# Patient Record
Sex: MALE | Race: WHITE | Employment: FULL TIME | URBAN - METROPOLITAN AREA
[De-identification: names, ages, dates, MRNs, and addresses within clinical notes are randomized per-mention and may not be internally consistent; named-entity substitution may affect disease eponyms.]

---

## 2017-01-11 ENCOUNTER — OFFICE VISIT (OUTPATIENT)
Dept: FAMILY MEDICINE CLINIC | Facility: CLINIC | Age: 42
End: 2017-01-11

## 2017-01-11 VITALS
WEIGHT: 204 LBS | DIASTOLIC BLOOD PRESSURE: 60 MMHG | HEIGHT: 72 IN | HEART RATE: 72 BPM | RESPIRATION RATE: 14 BRPM | TEMPERATURE: 98 F | SYSTOLIC BLOOD PRESSURE: 122 MMHG | BODY MASS INDEX: 27.63 KG/M2

## 2017-01-11 DIAGNOSIS — Z00.00 ANNUAL PHYSICAL EXAM: Primary | ICD-10-CM

## 2017-01-11 DIAGNOSIS — N52.9 ERECTILE DYSFUNCTION, UNSPECIFIED ERECTILE DYSFUNCTION TYPE: ICD-10-CM

## 2017-01-11 DIAGNOSIS — F43.8 ADJUSTMENT REACTION TO CHRONIC STRESS: ICD-10-CM

## 2017-01-11 DIAGNOSIS — F17.201 TOBACCO DEPENDENCE IN REMISSION: ICD-10-CM

## 2017-01-11 PROCEDURE — 99396 PREV VISIT EST AGE 40-64: CPT | Performed by: FAMILY MEDICINE

## 2017-01-11 RX ORDER — ALPRAZOLAM 1 MG/1
TABLET ORAL
Qty: 60 TABLET | Refills: 5 | Status: SHIPPED | OUTPATIENT
Start: 2017-01-11 | End: 2017-09-05

## 2017-01-11 RX ORDER — SILDENAFIL CITRATE 20 MG/1
TABLET ORAL DAILY PRN
Qty: 30 TABLET | Refills: 3 | Status: SHIPPED | OUTPATIENT
Start: 2017-01-11 | End: 2018-04-09

## 2017-01-11 NOTE — PROGRESS NOTES
Nomi Agustin is a 37year old male who presents for a complete physical exam.   HPI:   Patient presents with:  Physical      His last annual assessment has been over 1 year: Annual Physical due on 12/21/2016       Pt complains of doing well, wt down 20 has past surgical history that includes upper gi endoscopy,exam (2009); other surgical history; and Laparoscopic cholecystectomy. His family history includes Cancer (age of onset: [de-identified]) in his maternal grandfather.      He has a current medication list whic normal.   Mouth/Throat: Uvula is midline, oropharynx is clear and moist and mucous membranes are normal.   Eyes: Conjunctivae and EOM are normal. Pupils are equal, round, and reactive to light. Neck: Trachea normal and normal range of motion.  Neck supple years.    Assessment:  Problem List Items Addressed This Visit        Mental Health    Adjustment reaction to chronic stress    Overview     Alprazolam 0.5 mg prn         Current Assessment & Plan     Stable, refill meds but not using daily         Relevan

## 2017-09-02 DIAGNOSIS — F43.8 ADJUSTMENT REACTION TO CHRONIC STRESS: ICD-10-CM

## 2017-09-02 RX ORDER — ALPRAZOLAM 1 MG/1
TABLET ORAL
Qty: 60 TABLET | Refills: 0 | Status: CANCELLED | OUTPATIENT
Start: 2017-09-02

## 2017-09-05 ENCOUNTER — TELEPHONE (OUTPATIENT)
Dept: FAMILY MEDICINE CLINIC | Facility: CLINIC | Age: 42
End: 2017-09-05

## 2017-09-05 DIAGNOSIS — F43.8 ADJUSTMENT REACTION TO CHRONIC STRESS: ICD-10-CM

## 2017-09-05 RX ORDER — ALPRAZOLAM 1 MG/1
TABLET ORAL
Qty: 60 TABLET | Refills: 0 | OUTPATIENT
Start: 2017-09-05 | End: 2017-10-09

## 2017-09-05 NOTE — TELEPHONE ENCOUNTER
Refill request sent from pharmacy for Alprazolam. LOV 01/11/17. Will you refill ? Routed to Dr Bekah Ricardo.

## 2017-09-05 NOTE — TELEPHONE ENCOUNTER
Ok to phone in 1 month, but beeds follow up, LOV 1.2017      Signed Prescriptions Disp Refills    ALPRAZolam 1 MG Oral Tab 60 tablet 0      Sig: TAKE 1/2-1 TABLET BY MOUTH THREE TIMES DAILY AS NEEDED FOR ANXIETY        Authorizing Provider: Scott García

## 2017-09-05 NOTE — TELEPHONE ENCOUNTER
Patient is calling in for his refill for   ALPRAZolam (Tab) XANAX 1 MG     He is completely out and has been for 3 days. He would really like this expedited.  Please call into [facility] [address]. [phone] [facility] AT [address] [facility] [address] [address]

## 2017-10-09 NOTE — PROGRESS NOTES
Patient presents with: Anxiety      HPI:   This is a 40year old male coming in for anxiety, off EtOH 6 months, and ran out of xanax labor day weekend, still 1/2 ppd. When out of xanax, had severe anxiety, but otherwise doing very well.      HPI     He  re content normal.        ASSESSMENT AND PLAN:     Problem List Items Addressed This Visit        Mental Health    Adjustment reaction to chronic stress - Primary    Overview     Alprazolam 0.5 mg prn         Current Assessment & Plan     Tried going off arou

## 2018-04-04 ENCOUNTER — TELEPHONE (OUTPATIENT)
Dept: FAMILY MEDICINE CLINIC | Facility: CLINIC | Age: 43
End: 2018-04-04

## 2018-04-04 DIAGNOSIS — Z13.29 SCREENING FOR ENDOCRINE, METABOLIC AND IMMUNITY DISORDER: ICD-10-CM

## 2018-04-04 DIAGNOSIS — Z13.228 SCREENING FOR ENDOCRINE, METABOLIC AND IMMUNITY DISORDER: ICD-10-CM

## 2018-04-04 DIAGNOSIS — Z13.220 SCREENING, LIPID: ICD-10-CM

## 2018-04-04 DIAGNOSIS — Z13.21 ENCOUNTER FOR VITAMIN DEFICIENCY SCREENING: ICD-10-CM

## 2018-04-04 DIAGNOSIS — Z13.0 SCREENING, ANEMIA, DEFICIENCY, IRON: Primary | ICD-10-CM

## 2018-04-04 DIAGNOSIS — Z13.0 SCREENING FOR ENDOCRINE, METABOLIC AND IMMUNITY DISORDER: ICD-10-CM

## 2018-04-04 NOTE — TELEPHONE ENCOUNTER
Patient is looking to get blood work done tomorrow. Blood work in epic has . Please enter blood work to [facility] [employer] and notify patient to make sure he can have it done tomorrow.

## 2018-04-09 ENCOUNTER — OFFICE VISIT (OUTPATIENT)
Dept: FAMILY MEDICINE CLINIC | Facility: CLINIC | Age: 43
End: 2018-04-09

## 2018-04-09 VITALS
HEIGHT: 71 IN | RESPIRATION RATE: 14 BRPM | HEART RATE: 80 BPM | BODY MASS INDEX: 25.9 KG/M2 | SYSTOLIC BLOOD PRESSURE: 116 MMHG | DIASTOLIC BLOOD PRESSURE: 58 MMHG | WEIGHT: 185 LBS | TEMPERATURE: 99 F

## 2018-04-09 DIAGNOSIS — Z00.00 ANNUAL PHYSICAL EXAM: Primary | ICD-10-CM

## 2018-04-09 DIAGNOSIS — N52.9 ERECTILE DYSFUNCTION, UNSPECIFIED ERECTILE DYSFUNCTION TYPE: ICD-10-CM

## 2018-04-09 DIAGNOSIS — F43.8 ADJUSTMENT REACTION TO CHRONIC STRESS: ICD-10-CM

## 2018-04-09 PROCEDURE — 99396 PREV VISIT EST AGE 40-64: CPT | Performed by: FAMILY MEDICINE

## 2018-04-09 RX ORDER — ALPRAZOLAM 1 MG/1
TABLET ORAL
Qty: 60 TABLET | Refills: 5 | Status: SHIPPED | OUTPATIENT
Start: 2018-04-09 | End: 2018-08-17

## 2018-04-09 RX ORDER — SILDENAFIL CITRATE 20 MG/1
TABLET ORAL DAILY PRN
Qty: 30 TABLET | Refills: 3 | Status: SHIPPED | OUTPATIENT
Start: 2018-04-09 | End: 2018-10-08

## 2018-04-09 NOTE — PROGRESS NOTES
Arnulfo Weiner is a 40year old male who presents for a complete physical exam.   HPI:   Patient presents with:  Physical      His last annual assessment has been over 1 year: Annual Physical due on 01/11/2018       Pt complains of doing well overall    H 08:46 AM   HDL 38 (L) 05/27/2016 08:46 AM   TRIG 105 05/27/2016 08:46 AM    05/27/2016 08:46 AM   NONHDLC 123 05/27/2016 08:46 AM       No results found for: A1C, VITD, PSA           Current Outpatient Prescriptions on File Prior to Visit:  Delbert Erickson (37.1 °C)   Resp 14   Ht 71\"   Wt 185 lb   BMI 25.80 kg/m²  Estimated body mass index is 25.8 kg/m² as calculated from the following:    Height as of this encounter: 71\". Weight as of this encounter: 185 lb.    Physical Exam   Nursing note and vitals r erythema. Nails show no clubbing. Psychiatric: He has a normal mood and affect.  His speech is normal and behavior is normal. Judgment and thought content normal. Cognition and memory are normal.       ASSESSMENT AND PLAN:   Genella Spatz is a 40 year o

## 2018-08-17 DIAGNOSIS — F43.8 ADJUSTMENT REACTION TO CHRONIC STRESS: ICD-10-CM

## 2018-08-17 RX ORDER — ALPRAZOLAM 1 MG/1
TABLET ORAL
Qty: 60 TABLET | Refills: 1 | OUTPATIENT
Start: 2018-08-17 | End: 2018-10-08

## 2018-08-17 NOTE — TELEPHONE ENCOUNTER
LOV 4/9/2018     Future Appointments  Date Time Provider Nell Dalton   10/8/2018 5:00 PM Gladys Dickey MD [facility]        Refill request for:      Pending Prescriptions Disp Refills    ALPRAZolam 1 MG Oral Tab 60 tablet 1     Sig: TAKE 1/2-1

## 2018-08-17 NOTE — TELEPHONE ENCOUNTER
Refill for  ALPRAZolam 1 MG Oral Tab 60 tablet     Send to    [place] [address] [address] [facility], [place] - [address] [facility] AT [address] [facility] [address] [facility], [phone], [phone]

## 2018-10-08 NOTE — PROGRESS NOTES
Patient presents with:  Stress: 6 month f/u Alprazolam      Subjective   HPI:   This is a 44year old male coming in for adjustment reaction, averaging 2 1 mg xanax daily.      HPI   See reviewed tab for PMSFHx  REVIEW OF SYSTEMS:   GENERAL HEALTH: feels we 20 PRN         Relevant Medications    Sildenafil Citrate 20 MG Oral Tab      Really doing well, sildenafil not helping so Cialis is to be continued until it goes generic, doing much better on smoking but not fully stopped, continue to watch    No Follow-u

## 2019-02-08 DIAGNOSIS — F43.8 ADJUSTMENT REACTION TO CHRONIC STRESS: ICD-10-CM

## 2019-02-08 RX ORDER — ALPRAZOLAM 1 MG/1
TABLET ORAL
Qty: 180 TABLET | Refills: 0 | OUTPATIENT
Start: 2019-02-08 | End: 2019-04-08

## 2019-02-08 NOTE — TELEPHONE ENCOUNTER
LOV 10/8/2018     Future Appointments   Date Time Provider Nell Krystle   4/8/2019  5:00 PM Toshia Rivera MD [facility]        Refill request for:    Requested Prescriptions     Pending Prescriptions Disp Refills   • ALPRAZOLAM 1 MG Oral Tab [P

## 2019-03-26 ENCOUNTER — OFFICE VISIT (OUTPATIENT)
Dept: FAMILY MEDICINE CLINIC | Facility: CLINIC | Age: 44
End: 2019-03-26
Payer: COMMERCIAL

## 2019-03-26 VITALS
OXYGEN SATURATION: 98 % | DIASTOLIC BLOOD PRESSURE: 70 MMHG | BODY MASS INDEX: 26.74 KG/M2 | HEART RATE: 72 BPM | SYSTOLIC BLOOD PRESSURE: 122 MMHG | WEIGHT: 191 LBS | HEIGHT: 71 IN | TEMPERATURE: 98 F | RESPIRATION RATE: 16 BRPM

## 2019-03-26 DIAGNOSIS — I88.9 LYMPHADENITIS: Primary | ICD-10-CM

## 2019-03-26 PROCEDURE — 99213 OFFICE O/P EST LOW 20 MIN: CPT | Performed by: PHYSICIAN ASSISTANT

## 2019-03-26 RX ORDER — AMOXICILLIN 500 MG/1
500 TABLET, FILM COATED ORAL 2 TIMES DAILY
Qty: 14 TABLET | Refills: 0 | Status: SHIPPED | OUTPATIENT
Start: 2019-03-26 | End: 2019-04-02

## 2019-03-26 NOTE — PROGRESS NOTES
CC: Right sided neck pain     HISTORY OF PRESENT ILLNESS  Parminder Leonardo is a 44year old male who presents for evaluation of right sided neck pain. He initially noticed this about 1 week ago. He feels that it is now radiating into the jaw.   He has not h Posterior oropharynx clear without exudate or erythema. NECK: Supple. Tender 0.7 cm lymph superior anterior cervical chain on right. CARDIOVASCULAR: Regular rate and rhythm, no murmurs/ rubs/ gallops. PULMONARY: Normal effort on room air.  Clear to auscu

## 2019-03-26 NOTE — PATIENT INSTRUCTIONS
Ibuprofen 600 mg three times a day for 5 days. Always with food. Warm compresses 3 times per day 15-20 minutes.   Antibiotic (amoxicillin) twice a day x 7 days  Avoid manipulating

## 2019-04-08 ENCOUNTER — OFFICE VISIT (OUTPATIENT)
Dept: FAMILY MEDICINE CLINIC | Facility: CLINIC | Age: 44
End: 2019-04-08
Payer: COMMERCIAL

## 2019-04-08 VITALS
TEMPERATURE: 97 F | WEIGHT: 195 LBS | BODY MASS INDEX: 27.3 KG/M2 | SYSTOLIC BLOOD PRESSURE: 112 MMHG | HEART RATE: 64 BPM | DIASTOLIC BLOOD PRESSURE: 60 MMHG | HEIGHT: 71 IN | RESPIRATION RATE: 16 BRPM

## 2019-04-08 DIAGNOSIS — R53.83 FATIGUE, UNSPECIFIED TYPE: ICD-10-CM

## 2019-04-08 DIAGNOSIS — N52.9 ERECTILE DYSFUNCTION, UNSPECIFIED ERECTILE DYSFUNCTION TYPE: Primary | ICD-10-CM

## 2019-04-08 DIAGNOSIS — Z13.0 SCREENING, ANEMIA, DEFICIENCY, IRON: ICD-10-CM

## 2019-04-08 DIAGNOSIS — F43.8 ADJUSTMENT REACTION TO CHRONIC STRESS: ICD-10-CM

## 2019-04-08 DIAGNOSIS — Z01.89 ROUTINE LAB DRAW: ICD-10-CM

## 2019-04-08 PROCEDURE — 99213 OFFICE O/P EST LOW 20 MIN: CPT | Performed by: FAMILY MEDICINE

## 2019-04-08 RX ORDER — TADALAFIL 20 MG/1
20 TABLET ORAL AS NEEDED
Qty: 30 TABLET | Refills: 5 | Status: SHIPPED | OUTPATIENT
Start: 2019-04-08 | End: 2020-03-30

## 2019-04-08 RX ORDER — ALPRAZOLAM 1 MG/1
TABLET ORAL
Qty: 180 TABLET | Refills: 1 | Status: SHIPPED | OUTPATIENT
Start: 2019-04-08 | End: 2019-10-21

## 2019-04-08 NOTE — PROGRESS NOTES
Patient presents with:  Erectile Dysfuntion      Subjective   HPI:   This is a 44year old male coming in for follow-up ED. Viagra did not work as well as Cialis but cost is been an issue. He did get good Cialis from [facility] as not working as well.   Rubi Cabrera American: No      Diabetic: No      Tobacco smoker: No      Systolic Blood Pressure: 512 mmHg      Is BP treated: No      HDL Cholesterol: 33 mg/dL      Total Cholesterol: 155 mg/dL   Assessment and Plan:     Problem List Items Addressed This Visit

## 2019-10-21 DIAGNOSIS — F43.89 ADJUSTMENT REACTION TO CHRONIC STRESS: ICD-10-CM

## 2019-10-23 NOTE — TELEPHONE ENCOUNTER
Patient is calling for status on medication refill request. Patient stated he had a script but it , is hoping it can be refilled today.

## 2019-10-25 RX ORDER — ALPRAZOLAM 1 MG/1
TABLET ORAL
Qty: 60 TABLET | Refills: 0 | Status: SHIPPED | OUTPATIENT
Start: 2019-10-25 | End: 2019-10-31

## 2019-10-31 ENCOUNTER — OFFICE VISIT (OUTPATIENT)
Dept: FAMILY MEDICINE CLINIC | Facility: CLINIC | Age: 44
End: 2019-10-31
Payer: COMMERCIAL

## 2019-10-31 VITALS
TEMPERATURE: 98 F | HEIGHT: 71.5 IN | BODY MASS INDEX: 27.52 KG/M2 | HEART RATE: 72 BPM | WEIGHT: 201 LBS | SYSTOLIC BLOOD PRESSURE: 120 MMHG | DIASTOLIC BLOOD PRESSURE: 82 MMHG

## 2019-10-31 DIAGNOSIS — H00.014 HORDEOLUM EXTERNUM OF LEFT UPPER EYELID: Primary | ICD-10-CM

## 2019-10-31 DIAGNOSIS — F43.8 ADJUSTMENT REACTION TO CHRONIC STRESS: ICD-10-CM

## 2019-10-31 PROCEDURE — 99213 OFFICE O/P EST LOW 20 MIN: CPT | Performed by: FAMILY MEDICINE

## 2019-10-31 RX ORDER — ALPRAZOLAM 1 MG/1
TABLET ORAL 2 TIMES DAILY PRN
Qty: 60 TABLET | Refills: 5 | Status: SHIPPED | OUTPATIENT
Start: 2019-10-31 | End: 2020-03-30

## 2019-10-31 RX ORDER — CEPHALEXIN 500 MG/1
500 CAPSULE ORAL 3 TIMES DAILY
Qty: 21 CAPSULE | Refills: 0 | Status: SHIPPED | OUTPATIENT
Start: 2019-10-31 | End: 2019-11-07

## 2019-10-31 NOTE — PROGRESS NOTES
Patient presents with:  Sleep Problem: medication f/u   Sty: left eye       Subjective   HPI:   This is a 38year old male coming in for stye ad sleep issues. 2.5 weeks ago, yard work and poison ivy exposures. And had some swelling around both eyes.  Eyes Overview     Alprazolam 0.5 mg prn         Relevant Medications    ALPRAZolam 1 MG Oral Tab      Other Visit Diagnoses     Hordeolum externum of left upper eyelid    -  Primary    Relevant Medications    cephALEXin 500 MG Oral Cap      New stye, trial of K

## 2019-10-31 NOTE — PATIENT INSTRUCTIONS
Stye treatment:    Baby shampoo in a rick cup with a little warm water. Use Q-tip to apply it to the eyelid to open the pore, up to 3 times a day. Warm compresses afterwards as needed.

## 2020-03-26 ENCOUNTER — TELEPHONE (OUTPATIENT)
Dept: FAMILY MEDICINE CLINIC | Facility: CLINIC | Age: 45
End: 2020-03-26

## 2020-03-26 DIAGNOSIS — E55.9 VITAMIN D DEFICIENCY: Primary | ICD-10-CM

## 2020-03-26 DIAGNOSIS — F43.8 ADJUSTMENT REACTION TO CHRONIC STRESS: ICD-10-CM

## 2020-03-26 DIAGNOSIS — E78.6 LOW HDL (UNDER 40): ICD-10-CM

## 2020-03-26 DIAGNOSIS — Z00.00 LABORATORY EXAMINATION ORDERED AS PART OF A ROUTINE GENERAL MEDICAL EXAMINATION: ICD-10-CM

## 2020-03-26 DIAGNOSIS — N52.9 ERECTILE DYSFUNCTION, UNSPECIFIED ERECTILE DYSFUNCTION TYPE: ICD-10-CM

## 2020-03-26 NOTE — TELEPHONE ENCOUNTER
Virtual/Telephone Check-In    Lukas Mathew verbally consents to a Virtual/Telephone Check-In service on 3/26/2020. Patient understands and accepts financial responsibility for any deductible, co-insurance and/or co-pays associated with this service.

## 2020-03-30 PROCEDURE — 99212 OFFICE O/P EST SF 10 MIN: CPT | Performed by: FAMILY MEDICINE

## 2020-03-30 RX ORDER — TADALAFIL 20 MG/1
20 TABLET ORAL AS NEEDED
Qty: 30 TABLET | Refills: 5 | Status: SHIPPED | OUTPATIENT
Start: 2020-03-30 | End: 2021-04-08

## 2020-03-30 RX ORDER — ALPRAZOLAM 1 MG/1
TABLET ORAL 2 TIMES DAILY PRN
Qty: 60 TABLET | Refills: 5 | Status: SHIPPED | OUTPATIENT
Start: 2020-03-30 | End: 2020-10-14

## 2020-03-30 NOTE — TELEPHONE ENCOUNTER
Overall doing well, labs were due this month but has not yet gotten them. He does need a refill of the alprazolam for April and his Cialis.   Overall doing well and HDL remains low  Problem List Items Addressed This Visit        Mental Health    Adjustment

## 2020-05-12 ENCOUNTER — TELEPHONE (OUTPATIENT)
Dept: FAMILY MEDICINE CLINIC | Facility: CLINIC | Age: 45
End: 2020-05-12

## 2020-05-12 DIAGNOSIS — F43.8 ADJUSTMENT REACTION TO CHRONIC STRESS: ICD-10-CM

## 2020-05-12 RX ORDER — ALPRAZOLAM 1 MG/1
TABLET ORAL
Qty: 60 TABLET | Refills: 0 | OUTPATIENT
Start: 2020-05-12

## 2020-05-12 NOTE — TELEPHONE ENCOUNTER
Pt is calling back wants Alprazolam medication changed to    Sheridan Jay, [address] [facility]  [phone]

## 2020-05-12 NOTE — TELEPHONE ENCOUNTER
Refill request for:    Requested Prescriptions     Pending Prescriptions Disp Refills   • ALPRAZOLAM 1 MG Oral Tab [Pharmacy Med Name: ALPRAZOLAM 1MG TABLETS] 60 tablet 0     Sig: TAKE 1/2-1 TABLETS BY MOUTH 2 TIMES DAILY AS NEEDED FOR SLEEP OR ANXIETY

## 2020-10-14 PROBLEM — E78.6 LOW HDL (UNDER 40): Status: ACTIVE | Noted: 2020-10-14

## 2020-10-14 NOTE — PROGRESS NOTES
Virtual/Telephone Check-In    Lester Arrington verbally consents to a Virtual/Telephone Check-In service on 10/14/20. Patient has been referred to the [facility] website at [email or web address] to review the yearly Consent to Treat document.   Patient underst Overview     Labs in spring         Current Assessment & Plan     Stable, Continue present management. stable conditions, FOLLOW UP 6 months. Losing wt. Will give Rx sildenal 100 to pharcy he finds that is near his new home.       Return in

## 2021-04-08 NOTE — PROGRESS NOTES
This visit is conducted using Telemedicine with live, interactive video and audio. Patient has been referred to the [facility] website at [email or web address] to review the yearly Consent to Treat document.     Patient understands and accepts financial res T4  -     CBC, Platelet; No Differential  -     Vitamin D, 25-Hydroxy  4. Erectile dysfunction, unspecified erectile dysfunction type  Overview:  Sildenafil 20 PRN  Orders:  -     Tadalafil;  Take 1 tablet (20 mg total) by mouth as needed for Erectile Dysfu

## 2021-12-02 ENCOUNTER — TELEPHONE (OUTPATIENT)
Dept: FAMILY MEDICINE CLINIC | Facility: CLINIC | Age: 46
End: 2021-12-02

## 2021-12-02 DIAGNOSIS — Z00.00 LABORATORY EXAMINATION ORDERED AS PART OF A ROUTINE GENERAL MEDICAL EXAMINATION: Primary | ICD-10-CM

## 2021-12-02 NOTE — TELEPHONE ENCOUNTER
Please enter lab orders for the patient's upcoming physical appointment. Physical scheduled:    Your appointments     Date & Time Appointment Department [facility])    Dec 27, 2021  1:30 PM CST Physical - Established with Bernard Berkowitz  [facility]

## 2021-12-02 NOTE — TELEPHONE ENCOUNTER
1. Laboratory examination ordered as part of a routine general medical examination (Primary)  -     Comp Metabolic Panel (14)  -     Lipid Panel  -     TSH W Reflex To Free T4  -     CBC, Platelet; No Differential       OK to notify.  Thanks, Linda Asher MD

## 2021-12-27 ENCOUNTER — OFFICE VISIT (OUTPATIENT)
Dept: FAMILY MEDICINE CLINIC | Facility: CLINIC | Age: 46
End: 2021-12-27
Payer: COMMERCIAL

## 2021-12-27 VITALS
HEIGHT: 71 IN | WEIGHT: 183.19 LBS | SYSTOLIC BLOOD PRESSURE: 136 MMHG | RESPIRATION RATE: 16 BRPM | DIASTOLIC BLOOD PRESSURE: 82 MMHG | BODY MASS INDEX: 25.65 KG/M2 | TEMPERATURE: 98 F | HEART RATE: 68 BPM

## 2021-12-27 DIAGNOSIS — F43.8 ADJUSTMENT REACTION TO CHRONIC STRESS: ICD-10-CM

## 2021-12-27 DIAGNOSIS — N52.9 ERECTILE DYSFUNCTION, UNSPECIFIED ERECTILE DYSFUNCTION TYPE: ICD-10-CM

## 2021-12-27 DIAGNOSIS — Z00.00 ANNUAL PHYSICAL EXAM: Primary | ICD-10-CM

## 2021-12-27 DIAGNOSIS — F17.201 TOBACCO DEPENDENCE IN REMISSION: ICD-10-CM

## 2021-12-27 DIAGNOSIS — E78.6 LOW HDL (UNDER 40): ICD-10-CM

## 2021-12-27 PROCEDURE — 99396 PREV VISIT EST AGE 40-64: CPT | Performed by: FAMILY MEDICINE

## 2021-12-27 PROCEDURE — 3008F BODY MASS INDEX DOCD: CPT | Performed by: FAMILY MEDICINE

## 2021-12-27 PROCEDURE — 3075F SYST BP GE 130 - 139MM HG: CPT | Performed by: FAMILY MEDICINE

## 2021-12-27 PROCEDURE — 3079F DIAST BP 80-89 MM HG: CPT | Performed by: FAMILY MEDICINE

## 2021-12-27 RX ORDER — TADALAFIL 20 MG/1
20 TABLET ORAL AS NEEDED
Qty: 30 TABLET | Refills: 5 | Status: SHIPPED | OUTPATIENT
Start: 2021-12-27

## 2021-12-27 RX ORDER — ALPRAZOLAM 1 MG/1
TABLET ORAL 2 TIMES DAILY PRN
Qty: 60 TABLET | Refills: 5 | Status: SHIPPED | OUTPATIENT
Start: 2021-12-27

## 2021-12-27 NOTE — PROGRESS NOTES
Venkata Thao is a 40year old male who presents for a complete physical exam.     had concerns including Well Adult (Annual physical).    His last annual assessment has been over 1 year: Annual Physical due on 04/09/2019       Subjective:    He complains COVID-19 Vaccine(1) Never done  Annual Physical due on 04/09/2019  Annual Depression Screen due on 04/08/2020  Colonoscopy Never done  Influenza Vaccine(1) Never done      Review of Systems   Constitutional: Negative.   Negative for fatigue, fever and une as of this encounter: 5' 11\" (1.803 m). Weight as of this encounter: 183 lb 3.2 oz (83.1 kg). Physical Exam  Vitals and nursing note reviewed. Constitutional:       General: He is not in acute distress. Appearance: Normal appearance.    HENT: year old male who presents for a complete physical exam.   Pt's weight is Body mass index is 25.55 kg/m². , recommended low fat diet and aerobic exercise 30 minutes three times weekly.    Health maintenance, Up to date    Immunizations: Up to date   Noni Tinajero

## 2025-06-20 ENCOUNTER — OFFICE VISIT (OUTPATIENT)
Dept: FAMILY MEDICINE CLINIC | Facility: CLINIC | Age: 50
End: 2025-06-20
Payer: COMMERCIAL

## 2025-06-20 VITALS
HEIGHT: 71 IN | SYSTOLIC BLOOD PRESSURE: 134 MMHG | DIASTOLIC BLOOD PRESSURE: 76 MMHG | BODY MASS INDEX: 28.17 KG/M2 | OXYGEN SATURATION: 98 % | RESPIRATION RATE: 14 BRPM | WEIGHT: 201.19 LBS | HEART RATE: 70 BPM

## 2025-06-20 DIAGNOSIS — Z13.21 ENCOUNTER FOR VITAMIN DEFICIENCY SCREENING: ICD-10-CM

## 2025-06-20 DIAGNOSIS — N52.9 ERECTILE DYSFUNCTION, UNSPECIFIED ERECTILE DYSFUNCTION TYPE: ICD-10-CM

## 2025-06-20 DIAGNOSIS — Z00.00 LABORATORY EXAMINATION ORDERED AS PART OF A ROUTINE GENERAL MEDICAL EXAMINATION: ICD-10-CM

## 2025-06-20 DIAGNOSIS — Z12.5 SCREENING PSA (PROSTATE SPECIFIC ANTIGEN): ICD-10-CM

## 2025-06-20 DIAGNOSIS — Z00.00 ANNUAL PHYSICAL EXAM: Primary | ICD-10-CM

## 2025-06-20 RX ORDER — CYCLOBENZAPRINE HCL 5 MG
TABLET ORAL 3 TIMES DAILY PRN
Qty: 30 TABLET | Refills: 1 | Status: SHIPPED | OUTPATIENT
Start: 2025-06-20

## 2025-06-20 RX ORDER — TADALAFIL 20 MG/1
20 TABLET ORAL AS NEEDED
Qty: 30 TABLET | Refills: 5 | Status: SHIPPED | OUTPATIENT
Start: 2025-06-20

## 2025-06-20 NOTE — PROGRESS NOTES
Colt Petit is a 50 year old male who presents for a complete physical exam.     had concerns including Medication Request (Follow up ).   His last annual assessment has been over 1 year: Annual Physical Never done       Subjective:    History of Present Illness  Colt Petit is a 50 year old male who presents for an annual physical exam and evaluation of right upper quadrant pain.    He presents for his first annual physical in three years, with his last office visit on December 27, 2021. He has experienced a recent injury while attempting to stop his car from rolling towards the garage door, resulting in pulled muscles and pain in the right upper quadrant. The pain is worse at night and exacerbated by coughing or sneezing. He is concerned about potential internal organ damage but reports no other associated problems.    He has been using Cialis intermittently and has recently run out after four years of use. His last laboratory tests were conducted four years ago, revealing a low vitamin D level of 19 in 2020 and a testosterone level of 437 in 2016.    He exercises regularly but has not been able to this week due to the pain. He does not monitor his weight, which has increased, resulting in a body mass index of 28, up from a previous weight of approximately 190 pounds.     He complains of doing well overall. .     Tobacco:  He smoked tobacco in the past but quit greater than 12 months ago.  Tobacco Use[1]      Wt Readings from Last 4 Encounters:   06/20/25 201 lb 3.2 oz (91.3 kg)   12/27/21 183 lb 3.2 oz (83.1 kg)   04/08/21 201 lb (91.2 kg)   10/31/19 201 lb (91.2 kg)     Body mass index is 28.06 kg/m².     The ASCVD Risk score (Betty ELAINE, et al., 2019) failed to calculate for the following reasons:    Cannot find a previous HDL lab    Cannot find a previous total cholesterol lab    Chief Complaint Reviewed and Verified  Nursing Notes Reviewed and   Verified  Tobacco Reviewed  Allergies Reviewed   Medications Reviewed    Problem List Reviewed        Family History[2]   Social History     Socioeconomic History    Marital status: Single     Spouse name: Not on file    Number of children: Not on file    Years of education: Not on file    Highest education level: Not on file   Occupational History    Occupation: Construction    Tobacco Use    Smoking status: Former     Current packs/day: 0.00     Types: Cigarettes     Quit date: 2016     Years since quittin.8    Smokeless tobacco: Never   Vaping Use    Vaping status: Never Used   Substance and Sexual Activity    Alcohol use: No    Drug use: No    Sexual activity: Not on file   Other Topics Concern     Service Not Asked    Blood Transfusions Not Asked    Caffeine Concern Yes     Comment: 3x a week     Occupational Exposure Not Asked    Hobby Hazards Not Asked    Sleep Concern Not Asked    Stress Concern Not Asked    Weight Concern Not Asked    Special Diet Not Asked    Back Care Not Asked    Exercise Yes     Comment: walking daily    Bike Helmet Not Asked    Seat Belt Yes    Self-Exams Not Asked   Social History Narrative    Not on file     Social Drivers of Health     Food Insecurity: No Food Insecurity (2025)    NCSS - Food Insecurity     Worried About Running Out of Food in the Last Year: No     Ran Out of Food in the Last Year: No   Transportation Needs: No Transportation Needs (2025)    NCSS - Transportation     Lack of Transportation: No   Stress: Not on file   Housing Stability: Not At Risk (2025)    NCSS - Housing/Utilities     Has Housing: Yes     Worried About Losing Housing: No     Unable to Get Utilities: No      Exercise: three times per week.  Diet: watches minimally and watches fats closely    There are no preventive care reminders to display for this patient.   No results found for this or any previous visit.     Health Maintenance   Topic Date Due    Colorectal Cancer Screening  Never done      No recommendations  at this time   Health Maintenance Due   Topic Date Due    Annual Physical  Never done    Colorectal Cancer Screening  Never done    DTaP,Tdap,and Td Vaccines (1 - Tdap) 11/10/2009    COVID-19 Vaccine (1 - 2024-25 season) Never done    Annual Depression Screening  01/01/2025         Review of Systems   Constitutional: Negative.  Negative for activity change, appetite change, chills and fever.   HENT: Negative.     Eyes: Negative.    Respiratory: Negative.  Negative for shortness of breath.    Cardiovascular: Negative.  Negative for chest pain and palpitations.   Gastrointestinal: Negative.  Negative for abdominal pain.   Genitourinary: Negative.  Negative for dysuria.   Musculoskeletal:  Negative for arthralgias.   Skin: Negative.  Negative for rash.   Allergic/Immunologic: Negative.    Neurological: Negative.         Results:    No results found for requested labs within last 1833 days 8 hours. No results found for requested labs within last 1095 days.   No results found for requested labs within last 1833 days 8 hours.     Last A1c value was  % done  .     No results found for requested labs within last 1833 days 8 hours.   Results  LABS  Vitamin D: 19 ng/mL (2020)  Testosterone: 437 ng/dL (2016)     Objective:    EXAM:  /76   Pulse 70   Resp 14   Ht 5' 11\" (1.803 m)   Wt 201 lb 3.2 oz (91.3 kg)   SpO2 98%   BMI 28.06 kg/m²  Estimated body mass index is 28.06 kg/m² as calculated from the following:    Height as of this encounter: 5' 11\" (1.803 m).    Weight as of this encounter: 201 lb 3.2 oz (91.3 kg).   Physical Exam  Vitals and nursing note reviewed.   Constitutional:       General: He is not in acute distress.     Appearance: Normal appearance. He is well-developed.   HENT:      Head: Normocephalic and atraumatic.      Right Ear: Tympanic membrane and external ear normal.      Left Ear: Tympanic membrane and external ear normal.      Nose: Nose normal.      Mouth/Throat:      Mouth: Mucous membranes  are moist.   Eyes:      Extraocular Movements: Extraocular movements intact.      Pupils: Pupils are equal, round, and reactive to light.   Cardiovascular:      Rate and Rhythm: Normal rate and regular rhythm.      Pulses: Normal pulses.           Carotid pulses are 2+ on the right side and 2+ on the left side.       Radial pulses are 2+ on the right side and 2+ on the left side.        Dorsalis pedis pulses are 2+ on the right side and 2+ on the left side.        Posterior tibial pulses are 2+ on the right side and 2+ on the left side.      Heart sounds: Normal heart sounds, S1 normal and S2 normal. No murmur heard.  Pulmonary:      Effort: Pulmonary effort is normal. No respiratory distress.      Breath sounds: Normal breath sounds.   Abdominal:      General: Abdomen is flat. Bowel sounds are normal. There is no distension.      Palpations: Abdomen is soft.   Musculoskeletal:         General: Normal range of motion.      Cervical back: Normal range of motion and neck supple.      Right lower leg: No edema.      Left lower leg: No edema.   Skin:     General: Skin is warm and dry.      Capillary Refill: Capillary refill takes less than 2 seconds.      Findings: No rash.   Neurological:      General: No focal deficit present.      Mental Status: He is alert and oriented to person, place, and time.   Psychiatric:         Mood and Affect: Mood normal.         Behavior: Behavior normal.         Thought Content: Thought content normal.         Judgment: Judgment normal.        Physical Exam  MEASUREMENTS: BMI- 28.0.  ABDOMEN: Liver normal       Assessment & Plan:    Colt Petit is a 50 year old male who presents for a complete physical exam.   Pt's weight is Body mass index is 28.06 kg/m²., recommended low fat diet and aerobic exercise 30 minutes three times weekly.   Health maintenance, Up to date      Immunizations: Up to date   Immunization History   Administered Date(s) Administered    TD 11/09/2009         Pt  info given for: exercise, low fat diet, The patient indicates understanding of these issues and agrees to the plan.  The patient is asked to return for CPX in 1 years.    Assessment & Plan  Annual physical exam    Orders:    Testosterone Total    Erectile dysfunction, unspecified erectile dysfunction type    Orders:    Tadalafil; Take 1 tablet (20 mg total) by mouth as needed for Erectile Dysfunction.  Dispense: 30 tablet; Refill: 5    Laboratory examination ordered as part of a routine general medical examination    Orders:    Comp Metabolic Panel (14)    Lipid Panel    TSH W Reflex To Free T4    CBC, Platelet; No Differential    Vitamin D, 25-Hydroxy    Encounter for vitamin deficiency screening    Orders:    Vitamin D, 25-Hydroxy    Screening PSA (prostate specific antigen)    Orders:    PSA Total, Diagnostic       Assessment & Plan  Right upper quadrant pain  Pain likely due to muscle strain. No signs of internal organ damage. Normal liver examination.  - Prescribed cyclobenzaprine for muscle relaxation.  - Recommended NSAIDs for pain relief.    General Health Maintenance  Overdue for colonoscopy. Previous labs showed low vitamin D and testosterone. BMI indicates weight gain.  - Ordered PSA, CMP, and lipid panel.     I am having Colt Petit maintain his ALPRAZolam and Tadalafil.     No follow-ups on file.         [1]   Social History  Tobacco Use   Smoking Status Former    Current packs/day: 0.00    Types: Cigarettes    Quit date: 2016    Years since quittin.8   Smokeless Tobacco Never   [2]   Family History  Problem Relation Age of Onset    Cancer Maternal Grandfather 80        prostate

## 2025-06-20 NOTE — ASSESSMENT & PLAN NOTE
Orders:    Tadalafil; Take 1 tablet (20 mg total) by mouth as needed for Erectile Dysfunction.  Dispense: 30 tablet; Refill: 5

## 2025-06-20 NOTE — PROGRESS NOTES
The following individual(s) verbally consented to be recorded using ambient AI listening technology and understand that they can each withdraw their consent to this listening technology at any point by asking the clinician to turn off or pause the recording:    Patient name: Colt GARCIA Damaso

## 2025-07-01 ENCOUNTER — PATIENT OUTREACH (OUTPATIENT)
Dept: FAMILY MEDICINE CLINIC | Facility: CLINIC | Age: 50
End: 2025-07-01

## (undated) NOTE — MR AVS SNAPSHOT
[facility]  [place],  [address] [place] [address] [phone]               Thank you for choosing us for your health care visit with Kayli Swanson MD.  We are glad to serve you and happy to provide you with this summa CBC With Differential With Platelet    Complete by:  As directed    Assoc Dx: Annual physical exam [Z00.00]           Vitamin D, 25-Hydroxy    Complete by:  As directed    Assoc Dx:   Annual physical exam [Z00.00]                 Follow-up Instructions Start activities slowly and build up over time Do what you like   Get your heart pumping – brisk walking, biking, swimming Even 10 minute increments are effective and add up over the week   2 ½ hours per week – spread out over time Use a jenna to keep you